# Patient Record
Sex: MALE | Race: WHITE | NOT HISPANIC OR LATINO | URBAN - METROPOLITAN AREA
[De-identification: names, ages, dates, MRNs, and addresses within clinical notes are randomized per-mention and may not be internally consistent; named-entity substitution may affect disease eponyms.]

---

## 2018-04-18 ENCOUNTER — OFFICE VISIT (OUTPATIENT)
Dept: MEDICAL GROUP | Facility: MEDICAL CENTER | Age: 48
End: 2018-04-18
Payer: COMMERCIAL

## 2018-04-18 VITALS
RESPIRATION RATE: 16 BRPM | DIASTOLIC BLOOD PRESSURE: 70 MMHG | WEIGHT: 147.6 LBS | BODY MASS INDEX: 18.94 KG/M2 | SYSTOLIC BLOOD PRESSURE: 104 MMHG | HEART RATE: 56 BPM | OXYGEN SATURATION: 98 % | HEIGHT: 74 IN

## 2018-04-18 DIAGNOSIS — Z87.442 HISTORY OF KIDNEY STONES: ICD-10-CM

## 2018-04-18 PROCEDURE — 99202 OFFICE O/P NEW SF 15 MIN: CPT | Performed by: PHYSICIAN ASSISTANT

## 2018-04-18 ASSESSMENT — PATIENT HEALTH QUESTIONNAIRE - PHQ9: CLINICAL INTERPRETATION OF PHQ2 SCORE: 0

## 2018-04-18 NOTE — PROGRESS NOTES
"Chief Complaint   Patient presents with   • Establish Care   • Referral Needed     urology        HISTORY OF THE PRESENT ILLNESS: This is a 47 y.o. male new patient to our practice. This pleasant patient is here today to get a referral for urology. Does not want to discuss anything else. Declines any preventative screening, labs, colonoscopy. Declines any exam today.    History of kidney stones  3 years ago. Was told he would need follow up.      History reviewed. No pertinent past medical history.    History reviewed. No pertinent surgical history.    Family Status   Relation Status   • Mother Alive   • Father Alive   History reviewed. No pertinent family history.    Social History   Substance Use Topics   • Smoking status: Never Smoker   • Smokeless tobacco: Never Used   • Alcohol use No       Allergies: Patient has no known allergies.    Current Outpatient Prescriptions Ordered in Nudge   Medication Sig Dispense Refill   • albuterol (PROVENTIL) 90 MCG/ACT AERS Inhale 2 Puffs by mouth every 6 hours as needed for Shortness of Breath.       No current [facility]-ordered facility-administered medications on file.        Review of Systems: no current urinary symptoms    Exam: Blood pressure 104/70, pulse (!) 56, resp. rate 16, height 1.867 m (6' 1.5\"), weight 67 kg (147 lb 9.6 oz), SpO2 98 %.  General: Normal appearing. No distress.  Psych: Normal mood and affect. Alert and oriented x3. Judgment and insight is normal.      Assessment/Plan  1. History of kidney stones  REFERRAL TO UROLOGY     F/u as needed  "

## 2018-04-30 ENCOUNTER — HOSPITAL ENCOUNTER (OUTPATIENT)
Dept: RADIOLOGY | Facility: MEDICAL CENTER | Age: 48
End: 2018-04-30
Attending: NURSE PRACTITIONER
Payer: COMMERCIAL

## 2018-04-30 DIAGNOSIS — N20.2 CALCULUS OF KIDNEY AND URETER: ICD-10-CM

## 2018-04-30 PROCEDURE — 74018 RADEX ABDOMEN 1 VIEW: CPT

## 2019-08-16 ENCOUNTER — OFFICE VISIT (OUTPATIENT)
Dept: MEDICAL GROUP | Facility: MEDICAL CENTER | Age: 49
End: 2019-08-16
Payer: COMMERCIAL

## 2019-08-16 VITALS
BODY MASS INDEX: 19.19 KG/M2 | OXYGEN SATURATION: 99 % | TEMPERATURE: 98.2 F | DIASTOLIC BLOOD PRESSURE: 64 MMHG | RESPIRATION RATE: 14 BRPM | HEIGHT: 73 IN | WEIGHT: 144.8 LBS | SYSTOLIC BLOOD PRESSURE: 112 MMHG | HEART RATE: 76 BPM

## 2019-08-16 DIAGNOSIS — L98.9 SKIN LESION OF BACK: ICD-10-CM

## 2019-08-16 DIAGNOSIS — Z12.11 SCREENING FOR COLON CANCER: ICD-10-CM

## 2019-08-16 PROCEDURE — 99213 OFFICE O/P EST LOW 20 MIN: CPT | Performed by: PHYSICIAN ASSISTANT

## 2019-08-16 ASSESSMENT — PATIENT HEALTH QUESTIONNAIRE - PHQ9: CLINICAL INTERPRETATION OF PHQ2 SCORE: 0

## 2019-08-16 NOTE — PROGRESS NOTES
"Subjective:      Tyler Crossley Schwab is a 48 y.o. male who presents with Referral Needed (gi - colonoscopy) and Nevus (on back, would like looked at)        skin lesion    HPIPatient presents with need for GI referral for colonoscopy and needs evaluation of skin lesion on back. Not interested in lab orders or further preventative care. No family history of colon cancer. No changes in bowel habits. Skin lesion mid to upper back. Many years. Maybe getting a little more crusting? No personal history of skin cancer. No previous evaluation. No other similar lesions.    ROSnot reviewed other than above    PMHX: no heart or lung disease. No diabetes or immune disorder  Meds: on no regular daily meds  nkda  Non-smoker   Objective:     /64 (BP Location: Left arm, Patient Position: Sitting, BP Cuff Size: Adult)   Pulse 76   Temp 36.8 °C (98.2 °F) (Temporal)   Resp 14   Ht 1.854 m (6' 1\")   Wt 65.7 kg (144 lb 12.8 oz)   SpO2 99%   BMI 19.10 kg/m²      Physical Exam   Constitutional: He is oriented to person, place, and time. He appears well-developed and well-nourished. No distress.   Neurological: He is alert and oriented to person, place, and time.   Skin: Skin is warm and dry. No rash noted. He is not diaphoretic. No pallor.        5mm round raised dark brown/gray, no surrounding erythema or edema   Psychiatric: He has a normal mood and affect. His behavior is normal. Judgment and thought content normal.   Vitals reviewed.              Assessment/Plan:     1. Skin lesion of back  Dr. Olsen examined, will schedule for shave biopsy    2. Screening for colon cancer  GI referral placed    "

## 2019-08-22 ENCOUNTER — HOSPITAL ENCOUNTER (OUTPATIENT)
Dept: LAB | Facility: MEDICAL CENTER | Age: 49
End: 2019-08-22
Attending: FAMILY MEDICINE
Payer: COMMERCIAL

## 2019-08-22 ENCOUNTER — OFFICE VISIT (OUTPATIENT)
Dept: MEDICAL GROUP | Facility: MEDICAL CENTER | Age: 49
End: 2019-08-22
Payer: COMMERCIAL

## 2019-08-22 VITALS
DIASTOLIC BLOOD PRESSURE: 62 MMHG | HEIGHT: 73 IN | WEIGHT: 145.28 LBS | TEMPERATURE: 98.6 F | HEART RATE: 54 BPM | SYSTOLIC BLOOD PRESSURE: 100 MMHG | BODY MASS INDEX: 19.25 KG/M2 | OXYGEN SATURATION: 99 %

## 2019-08-22 DIAGNOSIS — L98.9 SKIN LESION OF BACK: ICD-10-CM

## 2019-08-22 DIAGNOSIS — D48.5 NEOPLASM OF UNCERTAIN BEHAVIOR OF SKIN: ICD-10-CM

## 2019-08-22 DIAGNOSIS — L81.9 PIGMENTED SKIN LESION: ICD-10-CM

## 2019-08-22 LAB
FORWARD REASON: SPWHY: NORMAL
FORWARDED TO LAB: SPWHR: NORMAL
PATHOLOGY CONSULT NOTE: NORMAL
SPECIMEN SENT: SPWT1: NORMAL

## 2019-08-22 PROCEDURE — 11104 PUNCH BX SKIN SINGLE LESION: CPT | Performed by: FAMILY MEDICINE

## 2019-08-23 PROBLEM — L81.9 PIGMENTED SKIN LESION: Status: ACTIVE | Noted: 2019-08-23

## 2019-08-23 NOTE — PROCEDURES
Patient has a 5 x 6 mm hyperpigmented lesion smooth borders back.  Recent texture change, although the lesion is been there for years.  Punch biopsy was performed.    Procedure note:  After informed written consent was obtained, using Betadine for cleansing and 1% Lidocaine with epinephrine for anesthetic, with sterile technique a 3 mm punch biopsy was used to obtain a biopsy specimen of the lesion. Hemostasis was obtained by pressure and wound was sutured 1 4-0 nylon suture. Antibiotic dressing is applied, and wound care instructions provided. Specimen sent for pathology.  Be alert for any signs of cutaneous infection. The specimen is labeled and sent to pathology for evaluation. The procedure was well tolerated without complications.

## 2019-08-27 LAB
DX ICD CODE: NORMAL
DX ICD CODE: NORMAL
PATH REPORT.FINAL DX SPEC: NORMAL
PATH REPORT.GROSS SPEC: NORMAL
PATH REPORT.SITE OF ORIGIN SPEC: NORMAL
PATHOLOGIST NAME: NORMAL
PAYMENT PROCEDURE: NORMAL

## 2019-08-30 ENCOUNTER — OFFICE VISIT (OUTPATIENT)
Dept: MEDICAL GROUP | Facility: MEDICAL CENTER | Age: 49
End: 2019-08-30
Payer: COMMERCIAL

## 2019-08-30 VITALS
HEIGHT: 73 IN | BODY MASS INDEX: 18.93 KG/M2 | WEIGHT: 142.86 LBS | HEART RATE: 54 BPM | SYSTOLIC BLOOD PRESSURE: 110 MMHG | OXYGEN SATURATION: 96 % | TEMPERATURE: 98.2 F | DIASTOLIC BLOOD PRESSURE: 82 MMHG

## 2019-08-30 DIAGNOSIS — L82.1 SEBORRHEIC KERATOSIS: ICD-10-CM

## 2019-08-30 PROCEDURE — 99024 POSTOP FOLLOW-UP VISIT: CPT | Performed by: FAMILY MEDICINE

## 2019-08-30 NOTE — PROGRESS NOTES
"Subjective:     CC: There were no encounter diagnoses.    HPI: Patient is a 48 y.o. male established patient who presents today to have suture removed and discuss biopsy results.   Advise patient biopsy was negative, seborrheic keratosis.   No infection and healed well.     History reviewed. No pertinent past medical history.    Social History     Tobacco Use   • Smoking status: Never Smoker   • Smokeless tobacco: Never Used   Substance Use Topics   • Alcohol use: No   • Drug use: No       Current Outpatient Medications Ordered in Epic   Medication Sig Dispense Refill   • albuterol (PROVENTIL) 90 MCG/ACT AERS Inhale 2 Puffs by mouth every 6 hours as needed for Shortness of Breath.       No current [facility]-ordered facility-administered medications on file.        Allergies:  Patient has no known allergies.    Health Maintenance: Completed      Objective:       Exam:  /82 (BP Location: Right arm, Patient Position: Sitting, BP Cuff Size: Adult)   Pulse (!) 54   Temp 36.8 °C (98.2 °F) (Temporal)   Ht 1.854 m (6' 0.99\")   Wt 64.8 kg (142 lb 13.7 oz)   SpO2 96%   BMI 18.85 kg/m²  Body mass index is 18.85 kg/m².    General: Normal appearing. No distress.  HEAD: NCAT  EYES: conjunctiva clear, lids without ptosis, pupils equal and reactive to light  EARS: ears normal shape and contour.  MOUTH: normal dentition   Neck:  Normal ROM  Pulmonary: Normal effort. Normal respiratory rate.  Cardiovascular: Well perfused. No LE edema  Neurologic: Grossly normal, no focal deficits  Skin: Warm and dry.  No obvious lesions.  Musculoskeletal: Normal gait and station.   Psych: Normal mood and affect. Alert and oriented x3. Judgment and insight is normal.       Assessment & Plan:     48 y.o. male with the following -     1. Seborrheic keratosis  Suture removed, doing well. Healing appropriately. Advised biopsy was benign    Please note that this dictation was created using voice recognition software. I have made every reasonable " attempt to correct obvious errors, but I expect that there are errors of grammar and possibly content that I did not discover before finalizing the note.

## 2025-05-07 ENCOUNTER — OFFICE VISIT (OUTPATIENT)
Dept: MEDICAL GROUP | Facility: LAB | Age: 55
End: 2025-05-07
Payer: COMMERCIAL

## 2025-05-07 VITALS
HEIGHT: 73 IN | DIASTOLIC BLOOD PRESSURE: 72 MMHG | OXYGEN SATURATION: 100 % | RESPIRATION RATE: 14 BRPM | BODY MASS INDEX: 19.61 KG/M2 | HEART RATE: 66 BPM | WEIGHT: 147.93 LBS | SYSTOLIC BLOOD PRESSURE: 130 MMHG | TEMPERATURE: 99.4 F

## 2025-05-07 DIAGNOSIS — Z12.5 PROSTATE CANCER SCREENING: ICD-10-CM

## 2025-05-07 DIAGNOSIS — Z00.00 WELL ADULT EXAM: ICD-10-CM

## 2025-05-07 DIAGNOSIS — Z23 NEED FOR VACCINATION: ICD-10-CM

## 2025-05-07 PROCEDURE — 99203 OFFICE O/P NEW LOW 30 MIN: CPT | Mod: 25 | Performed by: PHYSICIAN ASSISTANT

## 2025-05-07 PROCEDURE — 3075F SYST BP GE 130 - 139MM HG: CPT | Performed by: PHYSICIAN ASSISTANT

## 2025-05-07 PROCEDURE — 90471 IMMUNIZATION ADMIN: CPT | Performed by: PHYSICIAN ASSISTANT

## 2025-05-07 PROCEDURE — 90715 TDAP VACCINE 7 YRS/> IM: CPT | Performed by: PHYSICIAN ASSISTANT

## 2025-05-07 PROCEDURE — 3078F DIAST BP <80 MM HG: CPT | Performed by: PHYSICIAN ASSISTANT

## 2025-05-07 ASSESSMENT — PATIENT HEALTH QUESTIONNAIRE - PHQ9: CLINICAL INTERPRETATION OF PHQ2 SCORE: 0

## 2025-05-07 NOTE — ASSESSMENT & PLAN NOTE
Overall, will dividual with no complaints.  Is here for yearly lab work.    A lipid panel, thyroid function tests, A1c, PSA, vitamin D, CBC, and CMP will be ordered.  He was advised to fast for 8 to 10 hours prior to the blood draw.      Orders:    Lipid Profile; Future    CBC WITH DIFFERENTIAL; Future    Comp Metabolic Panel; Future    PROSTATE SPECIFIC AG DIAGNOSTIC; Future    TSH WITH REFLEX TO FT4; Future    VITAMIN D 25-HYDROXY

## 2025-05-07 NOTE — PROGRESS NOTES
"Follow-up subjective  Tyler Crossley Schwab is a 54 y.o. male who presents today to establish care.  He has a past medical history of sports induced asthma, left foot pain, and low back pain.    Newly complains of \"trouble getting food down\"  Onset several years ago, stable  Patient feels like this is most consistent when he eats rapidly because he is hungry coming back from an workout.  He says the food feels like it takes a little bit to get down into his stomach.    He denies any trouble swallowing, denies regurgitation of food, denies heartburn.   She would not like to move forward with any additional testing or medication management at this time.  Patient reports she would like to keep it posted if symptoms worsen.    Lumbar and foot pain  He has been evaluated by orthopedics for foot and back pain.  He is doing well, and reports no worsening symptoms. He was last seen by orthopedics on November 13, 2020 for for left foot pain.  He is an active runner and was running a trail when he felt mild discomfort on the ball of his left foot.  Recommendations would be forward included offloading metatarsal pads and anti-inflammatory creams for symptomatic relief.    Asthma:   Patient has had asthma for as long as he can remember.  He says his father was an asthmatic as well.  His symptoms are well-controlled, and he always runs with an inhaler.  He uses his albuterol inhaler infrequently, and would like to continue with this regimen.    Health maintenance:  Patient sees an optometrist every year, and he uses both contacts and glasses.  Dental hygiene: Patient sees a dentist every year, brushes his teeth twice a day, and is due for follow-up tomorrow 5/8/2025.  Exercise: Patient is an avid runner, and exercises daily  Diet: Patient eats an overall balanced diet.  Denies constipation, hematuria, hematochezia.  Denies BPH with LUTS.    No family history on file.    Social History     Socioeconomic History    Marital status: " "     Spouse name: Not on file    Number of children: Not on file    Years of education: Not on file    Highest education level: Not on file   Occupational History    Not on file   Tobacco Use    Smoking status: Never    Smokeless tobacco: Never   Vaping Use    Vaping status: Never Used   Substance and Sexual Activity    Alcohol use: No    Drug use: No    Sexual activity: Not on file   Other Topics Concern    Not on file   Social History Narrative    Not on file     Social Drivers of Health     Financial Resource Strain: Not on file   Food Insecurity: Not on file   Transportation Needs: Not on file   Physical Activity: Not on file   Stress: Not on file   Social Connections: Not on file   Intimate Partner Violence: Not on file   Housing Stability: Not on file       No past surgical history on file.    No past medical history on file.    Current Outpatient Medications   Medication Sig Dispense Refill    Albuterol Sulfate (VENTOLIN HFA INH) 0      albuterol (PROVENTIL) 90 MCG/ACT AERS Inhale 2 Puffs by mouth every 6 hours as needed for Shortness of Breath.       No current facility-administered medications for this visit.       No Known Allergies    Objective  /72 (BP Location: Left arm, Patient Position: Sitting, BP Cuff Size: Adult)   Pulse 66   Temp 37.4 °C (99.4 °F) (Temporal)   Resp 14   Ht 1.854 m (6' 1\")   Wt 67.1 kg (147 lb 14.9 oz)   SpO2 100%   Physical Exam  Constitutional:       Appearance: Normal appearance.   HENT:      Right Ear: Tympanic membrane, ear canal and external ear normal. There is no impacted cerumen.      Left Ear: Tympanic membrane, ear canal and external ear normal. There is no impacted cerumen.      Mouth/Throat:      Mouth: Mucous membranes are moist.   Eyes:      Extraocular Movements: Extraocular movements intact.      Pupils: Pupils are equal, round, and reactive to light.   Cardiovascular:      Rate and Rhythm: Regular rhythm.   Pulmonary:      Effort: Pulmonary " effort is normal.      Breath sounds: Normal breath sounds.   Musculoskeletal:      Right lower leg: No edema.      Left lower leg: No edema.   Skin:     General: Skin is warm and dry.   Neurological:      Mental Status: He is alert.   Psychiatric:         Mood and Affect: Mood normal.         Behavior: Behavior normal.         Thought Content: Thought content normal.         Labs:  None        Assessment & Plan  Well adult exam  Overall, will dividual with no complaints.  Is here for yearly lab work.    A lipid panel, thyroid function tests, A1c, PSA, vitamin D, CBC, and CMP will be ordered.  He was advised to fast for 8 to 10 hours prior to the blood draw.      Orders:    Lipid Profile; Future    CBC WITH DIFFERENTIAL; Future    Comp Metabolic Panel; Future    PROSTATE SPECIFIC AG DIAGNOSTIC; Future    TSH WITH REFLEX TO FT4; Future    VITAMIN D 25-HYDROXY          1. Well adult exam  - Lipid Profile; Future  - CBC WITH DIFFERENTIAL; Future  - Comp Metabolic Panel; Future  - PROSTATE SPECIFIC AG DIAGNOSTIC; Future  - TSH WITH REFLEX TO FT4; Future  - VITAMIN D 25-HYDROXY    2. Prostate cancer screening  - PROSTATE SPECIFIC AG DIAGNOSTIC; Future    3. Need for vaccination  - Tdap Vaccine =>8YO IM    Other orders  - Albuterol Sulfate (VENTOLIN HFA INH); 0

## 2025-05-16 ENCOUNTER — APPOINTMENT (OUTPATIENT)
Dept: LAB | Facility: MEDICAL CENTER | Age: 55
End: 2025-05-16
Payer: COMMERCIAL

## 2025-05-17 ENCOUNTER — HOSPITAL ENCOUNTER (OUTPATIENT)
Dept: LAB | Facility: MEDICAL CENTER | Age: 55
End: 2025-05-17
Attending: PHYSICIAN ASSISTANT
Payer: COMMERCIAL

## 2025-05-17 DIAGNOSIS — Z12.5 PROSTATE CANCER SCREENING: ICD-10-CM

## 2025-05-17 DIAGNOSIS — Z00.00 WELL ADULT EXAM: ICD-10-CM

## 2025-05-17 LAB
BASOPHILS # BLD AUTO: 0.5 % (ref 0–1.8)
BASOPHILS # BLD: 0.03 K/UL (ref 0–0.12)
EOSINOPHIL # BLD AUTO: 0.2 K/UL (ref 0–0.51)
EOSINOPHIL NFR BLD: 3.1 % (ref 0–6.9)
ERYTHROCYTE [DISTWIDTH] IN BLOOD BY AUTOMATED COUNT: 42.6 FL (ref 35.9–50)
HCT VFR BLD AUTO: 47.7 % (ref 42–52)
HGB BLD-MCNC: 15.8 G/DL (ref 14–18)
IMM GRANULOCYTES # BLD AUTO: 0.02 K/UL (ref 0–0.11)
IMM GRANULOCYTES NFR BLD AUTO: 0.3 % (ref 0–0.9)
LYMPHOCYTES # BLD AUTO: 1.43 K/UL (ref 1–4.8)
LYMPHOCYTES NFR BLD: 22.5 % (ref 22–41)
MCH RBC QN AUTO: 32.4 PG (ref 27–33)
MCHC RBC AUTO-ENTMCNC: 33.1 G/DL (ref 32.3–36.5)
MCV RBC AUTO: 97.9 FL (ref 81.4–97.8)
MONOCYTES # BLD AUTO: 0.47 K/UL (ref 0–0.85)
MONOCYTES NFR BLD AUTO: 7.4 % (ref 0–13.4)
NEUTROPHILS # BLD AUTO: 4.2 K/UL (ref 1.82–7.42)
NEUTROPHILS NFR BLD: 66.2 % (ref 44–72)
NRBC # BLD AUTO: 0 K/UL
NRBC BLD-RTO: 0 /100 WBC (ref 0–0.2)
PLATELET # BLD AUTO: 231 K/UL (ref 164–446)
PMV BLD AUTO: 10.1 FL (ref 9–12.9)
RBC # BLD AUTO: 4.87 M/UL (ref 4.7–6.1)
WBC # BLD AUTO: 6.4 K/UL (ref 4.8–10.8)

## 2025-05-17 PROCEDURE — 80061 LIPID PANEL: CPT

## 2025-05-17 PROCEDURE — 80053 COMPREHEN METABOLIC PANEL: CPT

## 2025-05-17 PROCEDURE — 85025 COMPLETE CBC W/AUTO DIFF WBC: CPT

## 2025-05-17 PROCEDURE — 36415 COLL VENOUS BLD VENIPUNCTURE: CPT

## 2025-05-17 PROCEDURE — 82306 VITAMIN D 25 HYDROXY: CPT

## 2025-05-17 PROCEDURE — 84153 ASSAY OF PSA TOTAL: CPT

## 2025-05-17 PROCEDURE — 84443 ASSAY THYROID STIM HORMONE: CPT

## 2025-05-18 LAB
25(OH)D3 SERPL-MCNC: 25 NG/ML (ref 30–100)
ALBUMIN SERPL BCP-MCNC: 4.5 G/DL (ref 3.2–4.9)
ALBUMIN/GLOB SERPL: 1.7 G/DL
ALP SERPL-CCNC: 86 U/L (ref 30–99)
ALT SERPL-CCNC: 25 U/L (ref 2–50)
ANION GAP SERPL CALC-SCNC: 11 MMOL/L (ref 7–16)
AST SERPL-CCNC: 28 U/L (ref 12–45)
BILIRUB SERPL-MCNC: 0.9 MG/DL (ref 0.1–1.5)
BUN SERPL-MCNC: 19 MG/DL (ref 8–22)
CALCIUM ALBUM COR SERPL-MCNC: 9.3 MG/DL (ref 8.5–10.5)
CALCIUM SERPL-MCNC: 9.7 MG/DL (ref 8.5–10.5)
CHLORIDE SERPL-SCNC: 105 MMOL/L (ref 96–112)
CHOLEST SERPL-MCNC: 206 MG/DL (ref 100–199)
CO2 SERPL-SCNC: 25 MMOL/L (ref 20–33)
CREAT SERPL-MCNC: 1.04 MG/DL (ref 0.5–1.4)
FASTING STATUS PATIENT QL REPORTED: NORMAL
GFR SERPLBLD CREATININE-BSD FMLA CKD-EPI: 84 ML/MIN/1.73 M 2
GLOBULIN SER CALC-MCNC: 2.6 G/DL (ref 1.9–3.5)
GLUCOSE SERPL-MCNC: 95 MG/DL (ref 65–99)
HDLC SERPL-MCNC: 69 MG/DL
LDLC SERPL CALC-MCNC: 127 MG/DL
POTASSIUM SERPL-SCNC: 4.6 MMOL/L (ref 3.6–5.5)
PROT SERPL-MCNC: 7.1 G/DL (ref 6–8.2)
PSA SERPL DL<=0.01 NG/ML-MCNC: 0.76 NG/ML (ref 0–4)
SODIUM SERPL-SCNC: 141 MMOL/L (ref 135–145)
TRIGL SERPL-MCNC: 49 MG/DL (ref 0–149)
TSH SERPL DL<=0.005 MIU/L-ACNC: 1.63 UIU/ML (ref 0.38–5.33)

## 2025-05-19 ENCOUNTER — RESULTS FOLLOW-UP (OUTPATIENT)
Dept: MEDICAL GROUP | Facility: LAB | Age: 55
End: 2025-05-19

## 2025-05-19 DIAGNOSIS — E78.5 DYSLIPIDEMIA: Primary | ICD-10-CM

## 2025-05-23 ENCOUNTER — RESULTS FOLLOW-UP (OUTPATIENT)
Dept: MEDICAL GROUP | Facility: LAB | Age: 55
End: 2025-05-23

## 2025-05-23 ENCOUNTER — HOSPITAL ENCOUNTER (OUTPATIENT)
Dept: RADIOLOGY | Facility: MEDICAL CENTER | Age: 55
End: 2025-05-23
Attending: PHYSICIAN ASSISTANT
Payer: COMMERCIAL

## 2025-05-23 DIAGNOSIS — I25.10 CORONARY ARTERY CALCIFICATION: Primary | ICD-10-CM

## 2025-05-23 DIAGNOSIS — E78.5 DYSLIPIDEMIA: ICD-10-CM

## 2025-05-23 DIAGNOSIS — E78.00 HYPERCHOLESTEROLEMIA: ICD-10-CM

## 2025-05-23 PROCEDURE — 4410556 CT-CARDIAC SCORING (SELF PAY ONLY)

## 2025-05-23 RX ORDER — ASPIRIN 81 MG/1
81 TABLET, CHEWABLE ORAL DAILY
Qty: 100 TABLET | Refills: 3 | Status: SHIPPED | OUTPATIENT
Start: 2025-05-23

## 2025-05-27 ENCOUNTER — APPOINTMENT (OUTPATIENT)
Dept: MEDICAL GROUP | Facility: LAB | Age: 55
End: 2025-05-27
Payer: COMMERCIAL

## 2025-05-28 ENCOUNTER — HOSPITAL ENCOUNTER (OUTPATIENT)
Dept: LAB | Facility: MEDICAL CENTER | Age: 55
End: 2025-05-28
Attending: PHYSICIAN ASSISTANT
Payer: COMMERCIAL

## 2025-05-28 ENCOUNTER — OFFICE VISIT (OUTPATIENT)
Dept: MEDICAL GROUP | Facility: LAB | Age: 55
End: 2025-05-28
Payer: COMMERCIAL

## 2025-05-28 VITALS
OXYGEN SATURATION: 97 % | SYSTOLIC BLOOD PRESSURE: 100 MMHG | HEIGHT: 73 IN | RESPIRATION RATE: 16 BRPM | TEMPERATURE: 98.1 F | HEART RATE: 70 BPM | WEIGHT: 141.09 LBS | BODY MASS INDEX: 18.7 KG/M2 | DIASTOLIC BLOOD PRESSURE: 62 MMHG

## 2025-05-28 DIAGNOSIS — R93.1 ELEVATED CORONARY ARTERY CALCIUM SCORE: ICD-10-CM

## 2025-05-28 DIAGNOSIS — E78.00 HYPERCHOLESTEROLEMIA: ICD-10-CM

## 2025-05-28 PROCEDURE — 82172 ASSAY OF APOLIPOPROTEIN: CPT

## 2025-05-28 PROCEDURE — 3074F SYST BP LT 130 MM HG: CPT | Performed by: PHYSICIAN ASSISTANT

## 2025-05-28 PROCEDURE — 3078F DIAST BP <80 MM HG: CPT | Performed by: PHYSICIAN ASSISTANT

## 2025-05-28 PROCEDURE — 99213 OFFICE O/P EST LOW 20 MIN: CPT | Performed by: PHYSICIAN ASSISTANT

## 2025-05-28 PROCEDURE — 83695 ASSAY OF LIPOPROTEIN(A): CPT

## 2025-05-28 PROCEDURE — 36415 COLL VENOUS BLD VENIPUNCTURE: CPT

## 2025-05-28 RX ORDER — ATORVASTATIN CALCIUM 20 MG/1
20 TABLET, FILM COATED ORAL NIGHTLY
Qty: 100 TABLET | Refills: 3 | Status: SHIPPED | OUTPATIENT
Start: 2025-05-28 | End: 2026-07-02

## 2025-05-28 ASSESSMENT — FIBROSIS 4 INDEX: FIB4 SCORE: 1.357575757575757576

## 2025-05-28 NOTE — ASSESSMENT & PLAN NOTE
Patient is an avid runner, and eats an overall balanced diet.      We had a long discussion about dietary recommendations he can make moving forward, such as gravitating towards more fiber rich foods, such as fruits, vegetables, whole grains, and beans, while limiting his saturated and trans fats found in red meat, full dairy products, and processed foods.    Patient will continue with daily exercise.    - Referral sent to vascular for further recommendations moving forward.  - Continue aspirin 81 mg daily  - Start atorvastatin 20 mg daily    Orders:    Lipoprotein (a); Future    APOLIPOPROTEIN B; Future    atorvastatin (LIPITOR) 20 MG Tab; Take 1 Tablet by mouth every evening.    Referral to Vascular Medicine    Lipid Profile; Future

## 2025-05-28 NOTE — ASSESSMENT & PLAN NOTE
CT cardiac score FINDINGS from 5/23/2025:    Coronary calcification:  LMA - 0.0  LCX - 0.0  LAD - 267.0  RCA - 15.7  Total Calcium Score: 282.8    - Continue aspirin 81 mg tablet  - Start atorvastatin 20 mg daily  - Referral to vascular for further evaluation.  Any recommendations moving forward are greatly appreciated.  - Ordered lipoprotein a as well as apolipoprotein B for genetic risk of HDL and LDL management.    Orders:    Lipoprotein (a); Future    APOLIPOPROTEIN B; Future    atorvastatin (LIPITOR) 20 MG Tab; Take 1 Tablet by mouth every evening.    Referral to Vascular Medicine    Lipid Profile; Future

## 2025-05-28 NOTE — PROGRESS NOTES
Subjective  Tyler Crossley Schwab is a 54 y.o. male who presents today to review lab work.    Patient CT calcium score revealed a total calcium score of 282.8.  Coronary calcifications are mostly in the left arterial descending artery.  Patient denied feeling short of breath, breaking out in a cold sweat, nausea, lightheadedness or fainting, back pain, unusual fatigue, or indigestion.    Labs reviewed today:    Lipid Profile    CBC WITH DIFFERENTIAL    Comp Metabolic Panel    PROSTATE SPECIFIC AG DIAGNOSTIC    TSH WITH REFLEX TO FT4    VITAMIN D 25-HYDROXY    Exercise: Patient is an avid runner, and exercises daily  Diet: Patient eats an overall balanced diet.  He will occasionally indulge in ice cream, as well as red meat, but this is not every day.  Denies constipation, hematuria, hematochezia.   Patient's father  at age 89 from a myocardial infarction.  Mother suffers from hypertension.      No family history on file.    Social History     Socioeconomic History    Marital status:      Spouse name: Not on file    Number of children: Not on file    Years of education: Not on file    Highest education level: Not on file   Occupational History    Not on file   Tobacco Use    Smoking status: Never    Smokeless tobacco: Never   Vaping Use    Vaping status: Never Used   Substance and Sexual Activity    Alcohol use: No    Drug use: No    Sexual activity: Not on file   Other Topics Concern    Not on file   Social History Narrative    Not on file     Social Drivers of Health     Financial Resource Strain: Not on file   Food Insecurity: Not on file   Transportation Needs: Not on file   Physical Activity: Not on file   Stress: Not on file   Social Connections: Not on file   Intimate Partner Violence: Not on file   Housing Stability: Not on file       Past Surgical History[1]    Past Medical History[2]    Current Medications[3]    Allergies[4]    Objective  /62 (BP Location: Left arm, Patient Position:  "Sitting, BP Cuff Size: Adult)   Pulse 70   Temp 36.7 °C (98.1 °F) (Temporal)   Resp 16   Ht 1.854 m (6' 1\")   Wt 64 kg (141 lb 1.5 oz)   SpO2 97%   Physical Exam  Constitutional:       Appearance: Normal appearance.   HENT:      Right Ear: External ear normal.      Left Ear: External ear normal.      Mouth/Throat:      Mouth: Mucous membranes are moist.      Pharynx: Oropharynx is clear.   Eyes:      General:         Right eye: No discharge.         Left eye: No discharge.      Extraocular Movements: Extraocular movements intact.      Pupils: Pupils are equal, round, and reactive to light.   Cardiovascular:      Rate and Rhythm: Normal rate and regular rhythm.   Pulmonary:      Effort: Pulmonary effort is normal.      Breath sounds: Normal breath sounds.   Abdominal:      General: Abdomen is flat. There is no distension.      Palpations: Abdomen is soft.   Musculoskeletal:      Cervical back: Normal range of motion.      Right lower leg: No edema.      Left lower leg: No edema.   Skin:     General: Skin is warm and dry.   Neurological:      Mental Status: He is alert.   Psychiatric:         Mood and Affect: Mood normal.         Behavior: Behavior normal.         Thought Content: Thought content normal.         Labs:   Component      Latest Ref [facility] 5/17/2025   WBC      4.8 - 10.8 K/uL 6.4    RBC      4.70 - 6.10 M/uL 4.87    Hemoglobin      14.0 - 18.0 g/dL 15.8    Hematocrit      42.0 - 52.0 % 47.7    MCV      81.4 - 97.8 fL 97.9 (H)    MCH      27.0 - 33.0 pg 32.4    MCHC      32.3 - 36.5 g/dL 33.1    RDW      35.9 - 50.0 fL 42.6    Platelet Count      164 - 446 K/uL 231    MPV      9.0 - 12.9 fL 10.1    Neutrophils-Polys      44.00 - 72.00 % 66.20    Lymphocytes      22.00 - 41.00 % 22.50    Monocytes      0.00 - 13.40 % 7.40    Eosinophils      0.00 - 6.90 % 3.10    Basophils      0.00 - 1.80 % 0.50    Immature Granulocytes      0.00 - 0.90 % 0.30    Nucleated RBC      0.00 - 0.20 /100 WBC 0.00  "   Neutrophils (Absolute)      1.82 - 7.42 K/uL 4.20    Lymphs (Absolute)      1.00 - 4.80 K/uL 1.43    Monos (Absolute)      0.00 - 0.85 K/uL 0.47    Eos (Absolute)      0.00 - 0.51 K/uL 0.20    Baso (Absolute)      0.00 - 0.12 K/uL 0.03    Immature Granulocytes (abs)      0.00 - 0.11 K/uL 0.02    NRBC (Absolute)      K/uL 0.00    Sodium      135 - 145 mmol/L 141    Potassium      3.6 - 5.5 mmol/L 4.6    Chloride      96 - 112 mmol/L 105    Co2      20 - 33 mmol/L 25    Anion Gap      7.0 - 16.0  11.0    Glucose      65 - 99 mg/dL 95    Bun      8 - 22 mg/dL 19    Creatinine      0.50 - 1.40 mg/dL 1.04    Calcium      8.5 - 10.5 mg/dL 9.7    Correct Calcium      8.5 - 10.5 mg/dL 9.3    AST(SGOT)      12 - 45 U/L 28    ALT(SGPT)      2 - 50 U/L 25    Alkaline Phosphatase      30 - 99 U/L 86    Total Bilirubin      0.1 - 1.5 mg/dL 0.9    Albumin      3.2 - 4.9 g/dL 4.5    Total Protein      6.0 - 8.2 g/dL 7.1    Globulin      1.9 - 3.5 g/dL 2.6    A-G Ratio      g/dL 1.7    Cholesterol,Tot      100 - 199 mg/dL 206 (H)    Triglycerides      0 - 149 mg/dL 49    HDL      >=40 mg/dL 69    LDL      <100 mg/dL 127 (H)    Prostatic Specific Antigen Tot      0.00 - 4.00 ng/mL 0.76    TSH      0.380 - 5.330 uIU/mL 1.630    25-Hydroxy   Vitamin D 25      30 - 100 ng/mL 25 (L)    Fasting Status Fasting    GFR (CKD-EPI)      >60 mL/min/1.73 m 2 84       Legend:  (H) High  (L) Low    Imagin25 CT-CARDIAC SCORING  IMPRESSION:     Calcium Score of 100-399 AND >75th percentile:     You have significant cholesterol (plaque) build-up in the arteries that supply blood flow to your heart. This does not mean you have any blockages in your arteries that require an intervention at this time, but you are at higher risk of developing heart   disease or a stroke. Therefore, you need to be aggressive about preventing further plaque build-up. We recommend treating this plaque with a healthy low sugar diet and regular exercise. We highly  recommend the use of aspirin (low dose, 81 mg once a day)    to help prevent further plaque build-up; please discuss these recommendations with your doctor. If you smoke, this likely has contributed to your cholesterol build-up, and you should quit as soon as possible. Please let your doctor know if you need   medications or other resources to help you quit. If you are overweight, we also recommend gradual weight loss until you reach a normal weight. Each of these recommendations will lower your future risk of heart disease and stroke, and can even help   decrease the amount of plaque you currently have. We do not routinely recommend any further testing based on this test result.     Guidelines based upon the American College of Cardiology 2018 recommendations.      Assessment & Plan  Hypercholesterolemia  Patient is an avid runner, and eats an overall balanced diet.      We had a long discussion about dietary recommendations he can make moving forward, such as gravitating towards more fiber rich foods, such as fruits, vegetables, whole grains, and beans, while limiting his saturated and trans fats found in red meat, full dairy products, and processed foods.    Patient will continue with daily exercise.    - Referral sent to vascular for further recommendations moving forward.  - Continue aspirin 81 mg daily  - Start atorvastatin 20 mg daily    Orders:    Lipoprotein (a); Future    APOLIPOPROTEIN B; Future    atorvastatin (LIPITOR) 20 MG Tab; Take 1 Tablet by mouth every evening.    Referral to Vascular Medicine    Lipid Profile; Future    Elevated coronary artery calcium score  CT cardiac score FINDINGS from 5/23/2025:    Coronary calcification:  LMA - 0.0  LCX - 0.0  LAD - 267.0  RCA - 15.7  Total Calcium Score: 282.8    - Continue aspirin 81 mg tablet  - Start atorvastatin 20 mg daily  - Referral to vascular for further evaluation.  Any recommendations moving forward are greatly appreciated.  - Ordered  lipoprotein a as well as apolipoprotein B for genetic risk of HDL and LDL management.    Orders:    Lipoprotein (a); Future    APOLIPOPROTEIN B; Future    atorvastatin (LIPITOR) 20 MG Tab; Take 1 Tablet by mouth every evening.    Referral to Vascular Medicine    Lipid Profile; Future          1. Hypercholesterolemia  - Lipoprotein (a); Future  - APOLIPOPROTEIN B; Future  - atorvastatin (LIPITOR) 20 MG Tab; Take 1 Tablet by mouth every evening.  Dispense: 100 Tablet; Refill: 3  - Referral to Vascular Medicine  - Lipid Profile; Future    2. Elevated coronary artery calcium score  - Lipoprotein (a); Future  - APOLIPOPROTEIN B; Future  - atorvastatin (LIPITOR) 20 MG Tab; Take 1 Tablet by mouth every evening.  Dispense: 100 Tablet; Refill: 3  - Referral to Vascular Medicine  - Lipid Profile; Future           [1] No past surgical history on file.  [2] No past medical history on file.  [3]   Current Outpatient Medications   Medication Sig Dispense Refill    atorvastatin (LIPITOR) 20 MG Tab Take 1 Tablet by mouth every evening. 100 Tablet 3    aspirin (ASA) 81 MG Chew Tab chewable tablet Chew 1 Tablet every day. 100 Tablet 3    Albuterol Sulfate (VENTOLIN HFA INH) 0      albuterol (PROVENTIL) 90 MCG/ACT AERS Inhale 2 Puffs by mouth every 6 hours as needed for Shortness of Breath.       No current facility-administered medications for this visit.   [4] No Known Allergies

## 2025-05-28 NOTE — PATIENT INSTRUCTIONS
Vitamin D supplementation plays a vital role in bone health, immune function, and overall wellbeing. I recommended vitamin D supplementation 7529-5068 IUs daily to help with other nutrient absorption, such as calcium, to prevent osteoporosis.

## 2025-05-29 NOTE — Clinical Note
REFERRAL APPROVAL NOTICE         Sent on May 29, 2025                   Tyler Crossley Schwab  [address]  [place] [address]                   Dear Mr. Schwab,    After a careful review of the medical information and benefit coverage, [facility] has processed your referral. See below for additional details.    If applicable, you must be actively enrolled with your insurance for coverage of the authorized service. If you have any questions regarding your coverage, please contact your insurance directly.    REFERRAL INFORMATION   Referral #:  [id number]  Referred-To Department    Referred-By Provider:  Vascular Medicine    Ninfa Medrano P.A.-C.   Vascular Medicine      [address] [place] [address]  [phone] [address] [place], [place] [address] [place] [address]  [phone]    Referral Start Date:  05/28/2025  Referral End Date:   05/28/2026             SCHEDULING  If you do not already have an appointment, please call [phone] to make an appointment.     MORE INFORMATION  If you do not already have a "[employer] account, sign up at: [email or web address]  You can access your medical information, make appointments, see lab results, billing information, and more.  If you have questions regarding this referral, please contact  the [facility] Referrals department at:             [phone]. Monday - Friday 8:00AM - 5:00PM.     Sincerely,    [facility]

## 2025-05-30 ENCOUNTER — RESULTS FOLLOW-UP (OUTPATIENT)
Dept: MEDICAL GROUP | Facility: LAB | Age: 55
End: 2025-05-30

## 2025-05-30 ENCOUNTER — TELEPHONE (OUTPATIENT)
Dept: HEALTH INFORMATION MANAGEMENT | Facility: OTHER | Age: 55
End: 2025-05-30
Payer: COMMERCIAL

## 2025-05-30 DIAGNOSIS — I25.10 CORONARY ARTERY CALCIFICATION: ICD-10-CM

## 2025-05-30 DIAGNOSIS — E78.41 ELEVATED LIPOPROTEIN(A): ICD-10-CM

## 2025-05-30 DIAGNOSIS — R93.1 ELEVATED CORONARY ARTERY CALCIUM SCORE: Primary | ICD-10-CM

## 2025-05-30 LAB
APO B100 SERPL-MCNC: 76 MG/DL (ref 66–133)
LPA SERPL-MCNC: 55 MG/DL

## 2025-06-13 ENCOUNTER — OFFICE VISIT (OUTPATIENT)
Dept: CARDIOLOGY | Facility: MEDICAL CENTER | Age: 55
End: 2025-06-13
Attending: PHYSICIAN ASSISTANT
Payer: COMMERCIAL

## 2025-06-13 VITALS
SYSTOLIC BLOOD PRESSURE: 108 MMHG | DIASTOLIC BLOOD PRESSURE: 70 MMHG | HEART RATE: 78 BPM | OXYGEN SATURATION: 99 % | WEIGHT: 140 LBS | BODY MASS INDEX: 17.97 KG/M2 | HEIGHT: 74 IN | RESPIRATION RATE: 18 BRPM

## 2025-06-13 DIAGNOSIS — E78.00 HYPERCHOLESTEROLEMIA: ICD-10-CM

## 2025-06-13 DIAGNOSIS — R93.1 AGATSTON CAC SCORE 200-399: Primary | ICD-10-CM

## 2025-06-13 PROCEDURE — 3078F DIAST BP <80 MM HG: CPT | Performed by: INTERNAL MEDICINE

## 2025-06-13 PROCEDURE — 99212 OFFICE O/P EST SF 10 MIN: CPT | Performed by: INTERNAL MEDICINE

## 2025-06-13 PROCEDURE — 93005 ELECTROCARDIOGRAM TRACING: CPT | Mod: TC | Performed by: INTERNAL MEDICINE

## 2025-06-13 PROCEDURE — 3074F SYST BP LT 130 MM HG: CPT | Performed by: INTERNAL MEDICINE

## 2025-06-13 PROCEDURE — 99204 OFFICE O/P NEW MOD 45 MIN: CPT | Performed by: INTERNAL MEDICINE

## 2025-06-13 ASSESSMENT — ENCOUNTER SYMPTOMS
GASTROINTESTINAL NEGATIVE: 1
PSYCHIATRIC NEGATIVE: 1
MYALGIAS: 0
PALPITATIONS: 0
DIZZINESS: 0
SHORTNESS OF BREATH: 0
VOMITING: 0
BLURRED VISION: 0
NERVOUS/ANXIOUS: 0
CARDIOVASCULAR NEGATIVE: 1
DEPRESSION: 0
NEUROLOGICAL NEGATIVE: 1
ABDOMINAL PAIN: 0
MUSCULOSKELETAL NEGATIVE: 1
CLAUDICATION: 0
NAUSEA: 0
CHILLS: 0
CONSTITUTIONAL NEGATIVE: 1
EYES NEGATIVE: 1
WEAKNESS: 0
DOUBLE VISION: 0
RESPIRATORY NEGATIVE: 1
FOCAL WEAKNESS: 0
COUGH: 0
HEADACHES: 0
FEVER: 0
WEIGHT LOSS: 0
BRUISES/BLEEDS EASILY: 0

## 2025-06-13 ASSESSMENT — FIBROSIS 4 INDEX: FIB4 SCORE: 1.357575757575757576

## 2025-06-13 NOTE — PROGRESS NOTES
Chief Complaint   Patient presents with    Other     NP Dx:R93.1 (ICD-10-CM) - Elevated coronary artery calcium [id number]     Coronary Artery Disease     NP Dx:(ICD-10-CM) - Coronary artery [id number] (ICD-10-CM) - Elevated lipoprotein(a)           Subjective     Tyler Crossley Schwab is a 54 y.o. male who presents today as a consult from Ninfa Ricardo for positive CT coronary calcium study.     Thank you for allowing me to evaluate Mr. Schwab, who as you know is a 54 year old male with dyslipidemia, lifelong nonsmoker, no family history of coronary artery disease. He underwent CT coronary calcium study which was positive as described. He is clinically doing well, from cardiac standpoint. He denies fatigue, chest pain, shortness of breath, palpitations, lower extremity edema, dizziness or syncope. 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     Past Medical History[1]  Past Surgical History[2]  Family History   Problem Relation Age of Onset    Heart Disease Father     Heart Attack Father      Social History     Socioeconomic History    Marital status:      Spouse name: Not on file    Number of children: Not on file    Years of education: Not on file    Highest education level: Not on file   Occupational History    Not on file   Tobacco Use    Smoking status: Never    Smokeless tobacco: Never   Vaping Use    Vaping status: Never Used   Substance and Sexual Activity    Alcohol use: No    Drug use: No    Sexual activity: Not on file   Other Topics Concern    Not on file   Social History Narrative    Not on file     Social Drivers of Health     Financial Resource Strain: Not on file   Food Insecurity: Not on file   Transportation Needs: Not on file   Physical Activity: Not on file   Stress: Not on file   Social Connections: Not on file   Intimate Partner Violence: Not on file   Housing Stability: Not on file     Allergies[3]  Encounter Medications[4]  Review of  "Systems   Constitutional: Negative.  Negative for chills, fever, malaise/fatigue and weight loss.   HENT: Negative.  Negative for hearing loss.    Eyes: Negative.  Negative for blurred vision and double vision.   Respiratory: Negative.  Negative for cough and shortness of breath.    Cardiovascular: Negative.  Negative for chest pain, palpitations, claudication and leg swelling.   Gastrointestinal: Negative.  Negative for abdominal pain, nausea and vomiting.   Genitourinary: Negative.  Negative for dysuria and urgency.   Musculoskeletal: Negative.  Negative for joint pain and myalgias.   Skin: Negative.  Negative for itching and rash.   Neurological: Negative.  Negative for dizziness, focal weakness, weakness and headaches.   Endo/Heme/Allergies: Negative.  Does not bruise/bleed easily.   Psychiatric/Behavioral: Negative.  Negative for depression. The patient is not nervous/anxious.               Objective     /70 (BP Location: Left arm, Patient Position: Sitting, BP Cuff Size: Adult)   Pulse 78   Resp 18   Ht 1.88 m (6' 2\")   Wt 63.5 kg (140 lb)   SpO2 99%   BMI 17.97 kg/m²     Physical Exam  Constitutional:       Appearance: Normal appearance. He is well-developed and normal weight.   HENT:      Head: Normocephalic and atraumatic.   Neck:      Vascular: No JVD.   Cardiovascular:      Rate and Rhythm: Normal rate and regular rhythm.      Heart sounds: Normal heart sounds.   Pulmonary:      Effort: Pulmonary effort is normal.      Breath sounds: Normal breath sounds.   Abdominal:      General: Bowel sounds are normal.      Palpations: Abdomen is soft.      Comments: No hepatosplenomegaly.   Musculoskeletal:         General: Normal range of motion.   Lymphadenopathy:      Cervical: No cervical adenopathy.   Skin:     General: Skin is warm and dry.   Neurological:      Mental Status: He is alert and oriented to person, place, and time.            CARDIAC STUDIES/PROCEDURES:    CT CARDIAC SCORING " (05/23/25)  COMPARISON: None.  FINDINGS:  Coronary calcification:  LMA - 0.0  LCX - 0.0  LAD - 267.0  RCA - 15.7  Total Calcium Score: 282.8  Percentile: Calcium score is above the 75th percentile for the patient's age and sex.  IMPRESSION:  Calcium Score of 100-399 AND >75th percentile:  (study result reviewed)     EKG was ordered for positive CT coronary calcium study, performed on (06/13/25) was reviewed: EKG, personally interpreted shows sinus rhythm.     Laboratory results of (05/17/25) were reviewed. Cholesterol profile of 206/49/69/127 mg/dL noted.    Assessment & Plan     1. Agatston CAC score 200-399  EKG      2. Hypercholesterolemia            Medical Decision Making: Today's Assessment/Status/Plan:        Positive CT coronary calcium study: He is a 54 year old male with positive CT coronary calcium study, dyslipidemia. He is clinically doing well from coronary standpoint. We will continue with current medical therapy including atorvastatin.  Hyperlipidemia: He has statin therapy ordered by his primary care physician.     We will follow up in 3 months.    Thank you for this consult.                  [1]   Past Medical History:  Diagnosis Date    Asthma     CAD (coronary artery disease)     Hyperlipidemia    [2]   Past Surgical History:  Procedure Laterality Date    NO PERTINENT PAST SURGICAL HISTORY     [3] No Known Allergies  [4]   Outpatient Encounter Medications as of 6/13/2025   Medication Sig Dispense Refill    aspirin (ASA) 81 MG Chew Tab chewable tablet Chew 1 Tablet every day. 100 Tablet 3    Albuterol Sulfate (VENTOLIN HFA INH) 0      albuterol (PROVENTIL) 90 MCG/ACT AERS Inhale 2 Puffs by mouth every 6 hours as needed for Shortness of Breath.      atorvastatin (LIPITOR) 20 MG Tab Take 1 Tablet by mouth every evening. (Patient not taking: Reported on 6/13/2025) 100 Tablet 3     No facility-administered encounter medications on file as of 6/13/2025.

## 2025-06-14 LAB — EKG IMPRESSION: NORMAL

## 2025-06-14 PROCEDURE — 93010 ELECTROCARDIOGRAM REPORT: CPT | Performed by: INTERNAL MEDICINE

## 2025-06-19 ENCOUNTER — OFFICE VISIT (OUTPATIENT)
Dept: MEDICAL GROUP | Facility: PHYSICIAN GROUP | Age: 55
End: 2025-06-19
Payer: COMMERCIAL

## 2025-06-19 ENCOUNTER — HOSPITAL ENCOUNTER (OUTPATIENT)
Dept: LAB | Facility: MEDICAL CENTER | Age: 55
End: 2025-06-19
Attending: PHYSICIAN ASSISTANT
Payer: COMMERCIAL

## 2025-06-19 VITALS
OXYGEN SATURATION: 100 % | BODY MASS INDEX: 17.97 KG/M2 | HEART RATE: 68 BPM | DIASTOLIC BLOOD PRESSURE: 70 MMHG | HEIGHT: 74 IN | WEIGHT: 140 LBS | SYSTOLIC BLOOD PRESSURE: 108 MMHG

## 2025-06-19 DIAGNOSIS — E78.00 HYPERCHOLESTEROLEMIA: ICD-10-CM

## 2025-06-19 DIAGNOSIS — R93.1 ELEVATED CORONARY ARTERY CALCIUM SCORE: Primary | ICD-10-CM

## 2025-06-19 DIAGNOSIS — R93.1 ELEVATED CORONARY ARTERY CALCIUM SCORE: ICD-10-CM

## 2025-06-19 LAB — CK SERPL-CCNC: 60 U/L (ref 0–154)

## 2025-06-19 PROCEDURE — 82172 ASSAY OF APOLIPOPROTEIN: CPT

## 2025-06-19 PROCEDURE — 3078F DIAST BP <80 MM HG: CPT

## 2025-06-19 PROCEDURE — 99402 PREV MED CNSL INDIV APPRX 30: CPT

## 2025-06-19 PROCEDURE — 82550 ASSAY OF CK (CPK): CPT

## 2025-06-19 PROCEDURE — 3074F SYST BP LT 130 MM HG: CPT

## 2025-06-19 PROCEDURE — 36415 COLL VENOUS BLD VENIPUNCTURE: CPT

## 2025-06-19 ASSESSMENT — FIBROSIS 4 INDEX: FIB4 SCORE: 1.357575757575757576

## 2025-06-19 NOTE — Clinical Note
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  He is quite concerned about myopathies.  As such because he is only taken 1 dose of atorvastatin we will have him get a baseline CPK today.  Just we have some objective evidence that he starts complaining of myopathy use for 4 recovery times.  His LDL target of less than 70 because he has a CAC score of nearly 300 elevated lipoprotein a.    He said cardiology wants to be a little more aggressive and having LDL target of less than 55, Multaq detecting CAD with stents placed as of yet

## 2025-06-19 NOTE — PROGRESS NOTES
"[facility]    Time with patient: 120vm - 874dm    Tyler Crossley Schwab presents for management of dyslipidemia    Referral Source: Ninfa Medrano P.A.-C.    Date Referral Placed: 5/30/25    Date First Seen in Clinic: 06/19/25      PERTINENT HLD PMHX  Age at Initial Diagnosis of Dyslipidemia: 5/23/25      Baseline Lipids Prior to Treatment:    Latest Reference Range & Units 05/17/25 07:37 05/28/25 13:25   Cholesterol,Tot 100 - 199 mg/dL 206 (H)    Triglycerides 0 - 149 mg/dL 49    HDL >=40 mg/dL 69    LDL <100 mg/dL 127 (H)    Apolipoprotein-B 66 - 133 mg/dL  76   Lipoprotein (a) <=29 mg/dL  55 (H)     History of ASCVD: none    Previously Attempted Interventions for Lipids - including outcome  Statin: none     Outcome: not applicable  Non-Statin: none  Outcome: not applicable    Secondary causes/contributors (report to medical director if present):   Endocrine/Hypothyroidism:  none reported   Liver disease: none reported   Renal disease/nephrotic syndrome:  none reported  Dietary-induced (ketogenic, lean mass hyper-responder)? no  Medications: None    CURRENT MED MGMT  Current Lipid Lowering Meds:   Statin: None  Non-Statin: None  Supplements: None  Current adverse drug reactions/side effects? N\A  Is Adherence an Issue? N\A    Any Family History Cardiovascular Disease? yes  Relationship and Age of Onset: Father-cardiovascular disease, but not prematurely.    Vitals:    06/19/25 0800   Weight: 63.5 kg (140 lb)   Height: 1.88 m (6' 2\")      BMI Readings from Last 1 Encounters:   06/19/25 17.97 kg/m²      Wt Readings from Last 3 Encounters:   06/19/25 63.5 kg (140 lb)   06/13/25 63.5 kg (140 lb)   05/28/25 64 kg (141 lb 1.5 oz)     BP Readings from Last 2 Encounters:   06/13/25 108/70   05/28/25 100/62       DATA REVIEW:  Most Recent Lipid Panel:   Lab Results   Component Value Date/Time    CHOLSTRLTOT 206 (H) 05/17/2025 07:37 AM     (H) 05/17/2025 07:37 AM    HDL 69 05/17/2025 07:37 AM    " "TRIGLYCERIDE 49 05/17/2025 07:37 AM     Lab Results   Component Value Date/Time     (H) 05/17/2025 07:37 AM      No results found for: \"LDLCALC\"   Lab Results   Component Value Date/Time    LIPOPROTA 55 (H) 05/28/2025 01:25 PM      Lab Results   Component Value Date/Time    APOB 76 05/28/2025 01:25 PM      No results found for: \"CRPHIGHSEN\"    Other Pertinent Blood Work:   Lab Results   Component Value Date    SODIUM 141 05/17/2025    POTASSIUM 4.6 05/17/2025    CHLORIDE 105 05/17/2025    CO2 25 05/17/2025    ANION 11.0 05/17/2025    GLUCOSE 95 05/17/2025    BUN 19 05/17/2025    CREATININE 1.04 05/17/2025    CALCIUM 9.7 05/17/2025    ASTSGOT 28 05/17/2025    ALTSGPT 25 05/17/2025    ALKPHOSPHAT 86 05/17/2025    TBILIRUBIN 0.9 05/17/2025    ALBUMIN 4.5 05/17/2025    AGRATIO 1.7 05/17/2025    TSHULTRASEN 1.630 05/17/2025       VASCULAR IMAGING:  CAC Score (if available):   CT CARDIAC SCORING (05/23/25)  COMPARISON: None.  FINDINGS:  Coronary calcification:  LMA - 0.0  LCX - 0.0  LAD - 267.0  RCA - 15.7  Total Calcium Score: 282.8  Percentile: Calcium score is above the 75th percentile for the patient's age and sex.      ASSESSMENT AND PLAN    Patient Type, check all that apply:   High risk primary prevention with  goal LDL <70  No clinical ASCVD event, but established subclinical CAD with CAC near 300, and above 75th percentile  Elevated lipoprotein a    Major ASCVD events: none    Evidence of genetic dyslipidemia (Familial Hyperlipidemia): No   However, the patient weighs approximately 140 pounds and is an ultramarathon runner, regularly participating in races ranging from 25 to 50 miles. He runs about 40 miles per week on average. His lipid panel would presumably be significantly worse without this level of exercise, although it is difficult to predict how elevated the values might be and whether they would meet the treatment threshold. The LDL-C could exceed 160 mg/dL, and triglycerides would likely be " elevated compared to his baseline.    ASCVD risk calculations (if applicable)  N/A    ACC/AHA Indication for Statin Therapy:  Established ASCVD: Indication for High intensity statin     Other Significant Risk Markers:  High-risk conditions: None   Risk-enhancers: None  Lipoprotein(a): High (>50 mg/dl or >125 nmol/L)  Most recent CAC percentile: >75th    Lipid Goals:  Primary: LDL-C <70 mg/dl  Secondary apoB <70 mg/dl  At goals? no    LIFESTYLE INTERVENTIONS  TOBACCO: n/a  Continued complete avoidance of all tobacco products   EtOH: n/a  Men: No more than two standard servings per day  Women: No more than one standard serving per day  PHYSICAL ACTIVITY: Continues to be very active with running approximately 40 miles per week  NUTRITION: Mediterranean     LIPID-LOWERING MEDICATION MANAGEMENT:     Statin Therapy:   Continue atorvastatin 20 mg once daily-prescribed by PCP, he is only taken 1 dose  At subsequent appointments, we may need to increase the dose or add Zetia  As concerned of muscle breakdown due to his long distance running.,  As such, we will order a CPK to get a baseline today    Non-Statin Meds:   None    Recommended Supplements: None     Studies Ordered at Todays Visit:  baseline CPK today  Blood Work To Be Obtained Prior to Next Visit: Lipid panel and ApoB  Follow-Up: 8 weeks    Vinod Aguilar, PharmD     CC:  IGNACIO Chery Monica, P.A.-C.

## 2025-06-19 NOTE — Clinical Note
Dr. Bloch.  I am ordering a baseline CPK today due to the patient's history as an ultramarathon runner, as he is concerned about the potential risk of statin-associated myopathy. He has only taken one dose of atorvastatin so far, making this an appropriate time to establish a baseline.  I put his LDL-C goal at <70 mg/dL, given his coronary artery calcium (CAC) score of nearly 300 and elevated lipoprotein(a). He mentioned that cardiology advised an LDL-C goal of <55 mg/dL, though that target may be somewhat aggressive.   Thanks,  Jarrod

## 2025-06-20 ENCOUNTER — RESULTS FOLLOW-UP (OUTPATIENT)
Dept: MEDICAL GROUP | Facility: LAB | Age: 55
End: 2025-06-20

## 2025-06-21 LAB — APO B100 SERPL-MCNC: 74 MG/DL (ref 66–133)

## 2025-06-23 ENCOUNTER — HOSPITAL ENCOUNTER (OUTPATIENT)
Dept: LAB | Facility: MEDICAL CENTER | Age: 55
End: 2025-06-23
Attending: PHYSICIAN ASSISTANT
Payer: COMMERCIAL

## 2025-06-23 DIAGNOSIS — R93.1 ELEVATED CORONARY ARTERY CALCIUM SCORE: ICD-10-CM

## 2025-06-23 DIAGNOSIS — E78.00 HYPERCHOLESTEROLEMIA: ICD-10-CM

## 2025-06-23 LAB
CHOLEST SERPL-MCNC: 126 MG/DL (ref 100–199)
FASTING STATUS PATIENT QL REPORTED: NORMAL
HDLC SERPL-MCNC: 53 MG/DL
LDLC SERPL CALC-MCNC: 66 MG/DL
TRIGL SERPL-MCNC: 35 MG/DL (ref 0–149)

## 2025-06-23 PROCEDURE — 36415 COLL VENOUS BLD VENIPUNCTURE: CPT

## 2025-06-23 PROCEDURE — 80061 LIPID PANEL: CPT

## 2025-07-10 ENCOUNTER — APPOINTMENT (OUTPATIENT)
Dept: MEDICAL GROUP | Facility: LAB | Age: 55
End: 2025-07-10
Payer: COMMERCIAL

## 2025-07-11 ENCOUNTER — OFFICE VISIT (OUTPATIENT)
Dept: MEDICAL GROUP | Facility: LAB | Age: 55
End: 2025-07-11
Payer: COMMERCIAL

## 2025-07-11 VITALS
BODY MASS INDEX: 18.22 KG/M2 | HEIGHT: 74 IN | OXYGEN SATURATION: 97 % | DIASTOLIC BLOOD PRESSURE: 72 MMHG | WEIGHT: 142 LBS | SYSTOLIC BLOOD PRESSURE: 122 MMHG | HEART RATE: 66 BPM

## 2025-07-11 DIAGNOSIS — E78.00 HYPERCHOLESTEROLEMIA: Primary | ICD-10-CM

## 2025-07-11 PROCEDURE — 99213 OFFICE O/P EST LOW 20 MIN: CPT | Performed by: PHYSICIAN ASSISTANT

## 2025-07-11 PROCEDURE — 3078F DIAST BP <80 MM HG: CPT | Performed by: PHYSICIAN ASSISTANT

## 2025-07-11 PROCEDURE — 3074F SYST BP LT 130 MM HG: CPT | Performed by: PHYSICIAN ASSISTANT

## 2025-07-11 ASSESSMENT — FIBROSIS 4 INDEX: FIB4 SCORE: 1.357575757575757576

## 2025-07-11 NOTE — PROGRESS NOTES
"Subjective  Tyler Crossley Schwab is a 54 y.o. 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  - Continues to run about 40 miles per week  - Patient is a lifelong non-smoker  - He is continued with atorvastatin 20 mg daily and 81 mg aspirin daily  - Patient has adopted a clean diet, and reports he only eats chicken breast, as well as vegetables and whole grains daily  - CPK was ordered to evaluate for muscle breakdown, we reviewed this lab today    Family History   Problem Relation Age of Onset    Heart Disease Father     Heart Attack Father        Social History     Socioeconomic History    Marital status:      Spouse name: Not on file    Number of children: Not on file    Years of education: Not on file    Highest education level: Not on file   Occupational History    Not on file   Tobacco Use    Smoking status: Never    Smokeless tobacco: Never   Vaping Use    Vaping status: Never Used   Substance and Sexual Activity    Alcohol use: No    Drug use: No    Sexual activity: Not on file   Other Topics Concern    Not on file   Social History Narrative    Not on file     Social Drivers of Health     Financial Resource Strain: Not on file   Food Insecurity: Not on file   Transportation Needs: Not on file   Physical Activity: Not on file   Stress: Not on file   Social Connections: Not on file   Intimate Partner Violence: Not on file   Housing Stability: Not on file       Past Surgical History[1]    Past Medical History[2]    Current Medications[3]    Allergies[4]    Objective  /72 (BP Location: Left arm, Patient Position: Sitting, BP Cuff Size: Adult)   Pulse 66   Ht 1.88 m (6' 2\")   Wt 64.4 kg (142 lb)   SpO2 97%   Physical Exam  Constitutional:       Appearance: Normal appearance.   HENT:      Right Ear: Tympanic membrane, ear canal and external ear normal. There is " no impacted cerumen.      Left Ear: Tympanic membrane, ear canal and external ear normal. There is no impacted cerumen.      Mouth/Throat:      Mouth: Mucous membranes are moist.   Eyes:      Extraocular Movements: Extraocular movements intact.      Pupils: Pupils are equal, round, and reactive to light.   Cardiovascular:      Rate and Rhythm: Normal rate and regular rhythm.   Pulmonary:      Effort: Pulmonary effort is normal.      Breath sounds: Normal breath sounds.   Musculoskeletal:      Cervical back: Normal range of motion.      Right lower leg: No edema.      Left lower leg: No edema.   Skin:     General: Skin is warm and dry.   Neurological:      Mental Status: He is alert.   Psychiatric:         Mood and Affect: Mood normal.         Behavior: Behavior normal.         Thought Content: Thought content normal.         Labs:   25 CREATINE KINASE        Component  Ref Range & Units (hover) 3 wk ago   CPK Total 60   Resulting Agency M          Component      Latest Ref Rng 2025   Cholesterol,Tot      100 - 199 mg/dL 206 (H)   126    Triglycerides      0 - 149 mg/dL 49   35    HDL      >=40 mg/dL 69   53    LDL      <100 mg/dL 127 (H)   66    Apolipoprotein-B      66 - 133 mg/dL  76     Lipoprotein (a)      <=29 mg/dL  55 (H)        Legend:  (H) High    Imagin/23/25 CT-CARDIAC SCORING  FINDINGS:     Coronary calcification:  LMA - 0.0  LCX - 0.0  LAD - 267.0  RCA - 15.7        Total Calcium Score: 282.8     Percentile: Calcium score is above the 75th percentile for the patient's age and sex.     Other findings:  Heart: Normal size.  Lungs: There is a calcified nodule in the left lower lobe.  Mediastinum: Normal.  Upper abdomen: Normal.      Assessment & Plan  Hypercholesterolemia  Patient is an avid runner, and eats an overall balanced diet.    - She has adopted a clean diet that includes only chicken breast, vegetables and whole grains.  He has limited his saturated and trans  fat intake found in meat, full dairy products, and processed foods.       Patient will continue with daily exercise.     - Continue with vascular medicine  - Continue aspirin 81 mg daily  - Continue atorvastatin 20 mg daily  - Patient would like to repeat CT cardiac score in 6 months evaluation  - Order repeat lipid panel at 6-month visit                  1. Hypercholesterolemia           [1]   Past Surgical History:  Procedure Laterality Date    NO PERTINENT PAST SURGICAL HISTORY     [2]   Past Medical History:  Diagnosis Date    Asthma     CAD (coronary artery disease)     Hyperlipidemia    [3]   Current Outpatient Medications   Medication Sig Dispense Refill    atorvastatin (LIPITOR) 20 MG Tab Take 1 Tablet by mouth every evening. 100 Tablet 3    aspirin (ASA) 81 MG Chew Tab chewable tablet Chew 1 Tablet every day. 100 Tablet 3    Albuterol Sulfate (VENTOLIN HFA INH) 0      albuterol (PROVENTIL) 90 MCG/ACT AERS Inhale 2 Puffs by mouth every 6 hours as needed for Shortness of Breath.       No current facility-administered medications for this visit.   [4] No Known Allergies

## 2025-07-11 NOTE — ASSESSMENT & PLAN NOTE
Patient is an avid runner, and eats an overall balanced diet.    - She has adopted a clean diet that includes only chicken breast, vegetables and whole grains.  He has limited his saturated and trans fat intake found in meat, full dairy products, and processed foods.       Patient will continue with daily exercise.     - Continue with vascular medicine  - Continue aspirin 81 mg daily  - Continue atorvastatin 20 mg daily  - Patient would like to repeat CT cardiac score in 6 months evaluation  - Order repeat lipid panel at 6-month visit

## 2025-08-14 ENCOUNTER — OFFICE VISIT (OUTPATIENT)
Dept: MEDICAL GROUP | Facility: PHYSICIAN GROUP | Age: 55
End: 2025-08-14
Payer: COMMERCIAL

## 2025-08-14 VITALS — OXYGEN SATURATION: 100 % | HEIGHT: 74 IN | WEIGHT: 142 LBS | BODY MASS INDEX: 18.22 KG/M2 | HEART RATE: 76 BPM

## 2025-08-14 DIAGNOSIS — E78.00 HYPERCHOLESTEROLEMIA: Primary | ICD-10-CM

## 2025-08-14 DIAGNOSIS — R93.1 AGATSTON CAC SCORE 200-399: ICD-10-CM

## 2025-08-14 PROCEDURE — 99402 PREV MED CNSL INDIV APPRX 30: CPT | Performed by: FAMILY MEDICINE

## 2025-08-14 ASSESSMENT — FIBROSIS 4 INDEX: FIB4 SCORE: 1.357575757575757576
